# Patient Record
Sex: MALE | Race: BLACK OR AFRICAN AMERICAN | NOT HISPANIC OR LATINO | URBAN - METROPOLITAN AREA
[De-identification: names, ages, dates, MRNs, and addresses within clinical notes are randomized per-mention and may not be internally consistent; named-entity substitution may affect disease eponyms.]

---

## 2024-07-31 ENCOUNTER — EMERGENCY (EMERGENCY)
Facility: HOSPITAL | Age: 64
LOS: 0 days | Discharge: ANOTHER HOSPITAL | End: 2024-08-01
Attending: STUDENT IN AN ORGANIZED HEALTH CARE EDUCATION/TRAINING PROGRAM
Payer: COMMERCIAL

## 2024-07-31 VITALS
RESPIRATION RATE: 18 BRPM | DIASTOLIC BLOOD PRESSURE: 78 MMHG | SYSTOLIC BLOOD PRESSURE: 122 MMHG | HEIGHT: 64 IN | WEIGHT: 110.01 LBS | TEMPERATURE: 98 F | HEART RATE: 72 BPM | OXYGEN SATURATION: 100 %

## 2024-07-31 DIAGNOSIS — R55 SYNCOPE AND COLLAPSE: ICD-10-CM

## 2024-07-31 DIAGNOSIS — Z88.8 ALLERGY STATUS TO OTHER DRUGS, MEDICAMENTS AND BIOLOGICAL SUBSTANCES: ICD-10-CM

## 2024-07-31 DIAGNOSIS — K56.609 UNSPECIFIED INTESTINAL OBSTRUCTION, UNSPECIFIED AS TO PARTIAL VERSUS COMPLETE OBSTRUCTION: ICD-10-CM

## 2024-07-31 DIAGNOSIS — I95.1 ORTHOSTATIC HYPOTENSION: ICD-10-CM

## 2024-07-31 LAB
BASOPHILS # BLD AUTO: 0.02 K/UL — SIGNIFICANT CHANGE UP (ref 0–0.2)
BASOPHILS NFR BLD AUTO: 0.4 % — SIGNIFICANT CHANGE UP (ref 0–2)
D DIMER BLD IA.RAPID-MCNC: 306 NG/ML DDU — HIGH
EOSINOPHIL # BLD AUTO: 0 K/UL — SIGNIFICANT CHANGE UP (ref 0–0.5)
EOSINOPHIL NFR BLD AUTO: 0 % — SIGNIFICANT CHANGE UP (ref 0–6)
HCT VFR BLD CALC: 32.8 % — LOW (ref 39–50)
HGB BLD-MCNC: 11 G/DL — LOW (ref 13–17)
IMM GRANULOCYTES NFR BLD AUTO: 0.4 % — SIGNIFICANT CHANGE UP (ref 0–0.9)
LYMPHOCYTES # BLD AUTO: 0.65 K/UL — LOW (ref 1–3.3)
LYMPHOCYTES # BLD AUTO: 12.3 % — LOW (ref 13–44)
MCHC RBC-ENTMCNC: 30.6 PG — SIGNIFICANT CHANGE UP (ref 27–34)
MCHC RBC-ENTMCNC: 33.5 G/DL — SIGNIFICANT CHANGE UP (ref 32–36)
MCV RBC AUTO: 91.4 FL — SIGNIFICANT CHANGE UP (ref 80–100)
MONOCYTES # BLD AUTO: 0.61 K/UL — SIGNIFICANT CHANGE UP (ref 0–0.9)
MONOCYTES NFR BLD AUTO: 11.5 % — SIGNIFICANT CHANGE UP (ref 2–14)
NEUTROPHILS # BLD AUTO: 4 K/UL — SIGNIFICANT CHANGE UP (ref 1.8–7.4)
NEUTROPHILS NFR BLD AUTO: 75.4 % — SIGNIFICANT CHANGE UP (ref 43–77)
NRBC # BLD: 0 /100 WBCS — SIGNIFICANT CHANGE UP (ref 0–0)
NT-PROBNP SERPL-SCNC: 229 PG/ML — HIGH (ref 0–125)
PLATELET # BLD AUTO: 295 K/UL — SIGNIFICANT CHANGE UP (ref 150–400)
RBC # BLD: 3.59 M/UL — LOW (ref 4.2–5.8)
RBC # FLD: 13.2 % — SIGNIFICANT CHANGE UP (ref 10.3–14.5)
TROPONIN I, HIGH SENSITIVITY RESULT: 22.2 NG/L — SIGNIFICANT CHANGE UP
WBC # BLD: 5.3 K/UL — SIGNIFICANT CHANGE UP (ref 3.8–10.5)
WBC # FLD AUTO: 5.3 K/UL — SIGNIFICANT CHANGE UP (ref 3.8–10.5)

## 2024-07-31 RX ORDER — SODIUM CHLORIDE 0.9 % (FLUSH) 0.9 %
1000 SYRINGE (ML) INJECTION ONCE
Refills: 0 | Status: COMPLETED | OUTPATIENT
Start: 2024-07-31 | End: 2024-07-31

## 2024-07-31 RX ADMIN — Medication 1000 MILLILITER(S): at 23:47

## 2024-07-31 NOTE — ED ADULT TRIAGE NOTE - STATUS:
Patient called via triage with c/o nausea, retching but no vomiting, chills but afebrile, and just reports feeling like she may be coming down with something the past couple days.  Now with back pain today - unsure if it is related to retching a lot yesterday.    Wondering if she needs to go to ED or if she can stay home & monitor symptoms.  Instructed to go to ED if develops fever, abdominal pain, vomiting & unable to keep liquids/meds down.  Will have coordinator f/u tomorrow.   [employer]

## 2024-07-31 NOTE — ED ADULT NURSE NOTE - OBJECTIVE STATEMENT
Pt presents to ED c/o near syncope while on plane from [place]. She admits that "I didn't really eat much all day". At this time, denies headache, dezziness, chest pain, nausea. Pt presents to ED a&ox3 c/o dizziness, vomiting, and near syncope while on plane from [place]. She admits that "I didn't really eat much all day". At this time, denies hematemesis, headache, dizziness, chest pain, nausea, body aches, chills, chest pain, urinary symptom. PT placed on cardiac monitor. Respirations are even and unlabored on room air. The pt adds that she has had history of SBO before, and has had nasogastric tubes placed before. Her last bowel movement was on the plane. She adds that she has been passing gas tonight and this morning.

## 2024-07-31 NOTE — ED ADULT NURSE NOTE - NSFALLRISKINTERV_ED_ALL_ED
Assistance OOB with selected safe patient handling equipment if applicable/Communicate fall risk and risk factors to all staff, patient, and family/Orthostatic vital signs/Provide visual cue: yellow wristband, yellow gown, etc/Reinforce activity limits and safety measures with patient and family/Call bell, personal items and telephone in reach/Instruct patient to call for assistance before getting out of bed/chair/stretcher/Non-slip footwear applied when patient is off stretcher/[place] to call system/Physically safe environment - no spills, clutter or unnecessary equipment/Purposeful Proactive Rounding/Room/bathroom lighting operational, light cord in reach

## 2024-07-31 NOTE — ED ADULT NURSE NOTE - CHIEF COMPLAINT QUOTE
biba from [facility] s/p near syncope while on flight from [facility] noted with orthostatic hypotension per paramedics pt states didn't eat well today, denies dizziness at present, c/o abdominal bloating denies pain at present hx htn and pseudo bowel obstruction with intestinal surgery x 2

## 2024-07-31 NOTE — ED ADULT NURSE NOTE - NS ED NURSE DISCH DISPOSITION
TIA/ISCHEMIC/HEMORRHAGIC STROKE  Ischemic Stroke-LEFT FRONTAL    YOUR STROKE RISK FACTORS INCLUDE:   It is important that you know your risk factors and that you work with your doctor on treatment and lifestyle changes to lower your risk of having a future stroke.     Personal Stroke Risks: Non-Modifiable: Age over 55 years, Male  Personal Stroke Risks: Modifiable: High blood pressure, High cholesterol, Irregular heart beat (Atrial Fib)    MEDICATIONS:  See Discharge Medication List  Take medications as they are scheduled, and talk with your physician if there are problems taking medications.  Keep a list of medications up to date and carry with you at all times.      VACCINES:  Most Recent Immunizations   Administered Date(s) Administered    Influenza, High Dose quadrivalent, preserve-free 09/28/2022    TD Adult, Adsorbed 02/02/2002    Tdap 04/15/2023     Follow up with your doctor regarding influenza and/or pneumonia vaccine(s).    ACTIVITY:  Balance activity with rest    SMOKING:  Avoid all tobacco products and second hand smoke.  Smoking Cessation Counseling offered:  [place] Toll Free Quit Line: [phone]  [place] Tobacco Quit Line: 1-531-QUIT-YES ([phone])      DIET:  Low fat/ low cholesterol    EDUCATION MATERIALS:  Stroke Folder    B. E.  F.A.S.T. is an easy way to remember the signs of stroke.    Call 9-1-1 if:    BALANCE-Sudden loss of coordination or balance    EYES-Sudden change in vision    FACE-Sudden weakness on one side of the face or facial droop    ARM-Sudden arm or leg weakness or numbness    SPEECH-Sudden slurred speech, trouble speaking, trouble understanding speech    TERRIBLE HEADACHE-Sudden onset of a terrible headache    If someone has any of these symptoms, even if they go away, it is important to call 911 immediately. It is important to make note of the time the symptoms first appeared to receive the best treatment.     Transferred

## 2024-08-01 VITALS
TEMPERATURE: 98 F | SYSTOLIC BLOOD PRESSURE: 151 MMHG | HEART RATE: 63 BPM | OXYGEN SATURATION: 100 % | RESPIRATION RATE: 15 BRPM | DIASTOLIC BLOOD PRESSURE: 91 MMHG

## 2024-08-01 LAB
ALBUMIN SERPL ELPH-MCNC: 4.3 G/DL — SIGNIFICANT CHANGE UP (ref 3.3–5)
ALP SERPL-CCNC: 95 U/L — SIGNIFICANT CHANGE UP (ref 40–120)
ALT FLD-CCNC: 24 U/L — SIGNIFICANT CHANGE UP (ref 12–78)
ANION GAP SERPL CALC-SCNC: 12 MMOL/L — SIGNIFICANT CHANGE UP (ref 5–17)
ANION GAP SERPL CALC-SCNC: 13 MMOL/L — SIGNIFICANT CHANGE UP (ref 5–17)
APTT BLD: 24.5 SEC — SIGNIFICANT CHANGE UP (ref 24.5–35.6)
AST SERPL-CCNC: 10 U/L — LOW (ref 15–37)
BILIRUB SERPL-MCNC: 0.3 MG/DL — SIGNIFICANT CHANGE UP (ref 0.2–1.2)
BLD GP AB SCN SERPL QL: SIGNIFICANT CHANGE UP
BUN SERPL-MCNC: 65 MG/DL — HIGH (ref 7–23)
BUN SERPL-MCNC: 68 MG/DL — HIGH (ref 7–23)
CALCIUM SERPL-MCNC: 10.7 MG/DL — HIGH (ref 8.5–10.1)
CALCIUM SERPL-MCNC: 11 MG/DL — HIGH (ref 8.5–10.1)
CHLORIDE SERPL-SCNC: 92 MMOL/L — LOW (ref 96–108)
CHLORIDE SERPL-SCNC: 94 MMOL/L — LOW (ref 96–108)
CO2 SERPL-SCNC: 32 MMOL/L — HIGH (ref 22–31)
CO2 SERPL-SCNC: 33 MMOL/L — HIGH (ref 22–31)
CREAT SERPL-MCNC: 5.06 MG/DL — HIGH (ref 0.5–1.3)
CREAT SERPL-MCNC: 5.09 MG/DL — HIGH (ref 0.5–1.3)
EGFR: 12 ML/MIN/1.73M2 — LOW
EGFR: 12 ML/MIN/1.73M2 — LOW
GLUCOSE SERPL-MCNC: 100 MG/DL — HIGH (ref 70–99)
GLUCOSE SERPL-MCNC: 108 MG/DL — HIGH (ref 70–99)
INR BLD: 0.94 RATIO — SIGNIFICANT CHANGE UP (ref 0.85–1.18)
LACTATE SERPL-SCNC: 1.2 MMOL/L — SIGNIFICANT CHANGE UP (ref 0.7–2)
MAGNESIUM SERPL-MCNC: 2.7 MG/DL — HIGH (ref 1.6–2.6)
MAGNESIUM SERPL-MCNC: 2.7 MG/DL — HIGH (ref 1.6–2.6)
PHOSPHATE SERPL-MCNC: 4.4 MG/DL — SIGNIFICANT CHANGE UP (ref 2.5–4.5)
POTASSIUM SERPL-MCNC: 2.8 MMOL/L — CRITICAL LOW (ref 3.5–5.3)
POTASSIUM SERPL-MCNC: 3.2 MMOL/L — LOW (ref 3.5–5.3)
POTASSIUM SERPL-SCNC: 2.8 MMOL/L — CRITICAL LOW (ref 3.5–5.3)
POTASSIUM SERPL-SCNC: 3.2 MMOL/L — LOW (ref 3.5–5.3)
PROT SERPL-MCNC: 9.2 GM/DL — HIGH (ref 6–8.3)
PROTHROM AB SERPL-ACNC: 11.3 SEC — SIGNIFICANT CHANGE UP (ref 9.5–13)
SODIUM SERPL-SCNC: 138 MMOL/L — SIGNIFICANT CHANGE UP (ref 135–145)
SODIUM SERPL-SCNC: 138 MMOL/L — SIGNIFICANT CHANGE UP (ref 135–145)

## 2024-08-01 RX ORDER — DEXTROSE MONOHYDRATE AND SODIUM CHLORIDE 5; .3 G/100ML; G/100ML
1000 INJECTION, SOLUTION INTRAVENOUS ONCE
Refills: 0 | Status: COMPLETED | OUTPATIENT
Start: 2024-08-01 | End: 2024-08-01

## 2024-08-01 RX ORDER — POTASSIUM CHLORIDE 600 MG/1
40 TABLET, FILM COATED, EXTENDED RELEASE ORAL ONCE
Refills: 0 | Status: COMPLETED | OUTPATIENT
Start: 2024-08-01 | End: 2024-08-01

## 2024-08-01 RX ORDER — POTASSIUM CHLORIDE 600 MG/1
10 TABLET, FILM COATED, EXTENDED RELEASE ORAL ONCE
Refills: 0 | Status: COMPLETED | OUTPATIENT
Start: 2024-08-01 | End: 2024-08-01

## 2024-08-01 RX ADMIN — Medication 1000 MILLILITER(S): at 00:50

## 2024-08-01 RX ADMIN — POTASSIUM CHLORIDE 40 MILLIEQUIVALENT(S): 600 TABLET, FILM COATED, EXTENDED RELEASE ORAL at 00:16

## 2024-08-01 RX ADMIN — POTASSIUM CHLORIDE 100 MILLIEQUIVALENT(S): 600 TABLET, FILM COATED, EXTENDED RELEASE ORAL at 00:17

## 2024-08-01 RX ADMIN — DEXTROSE MONOHYDRATE AND SODIUM CHLORIDE 1000 MILLILITER(S): 5; .3 INJECTION, SOLUTION INTRAVENOUS at 02:35

## 2024-08-01 RX ADMIN — DEXTROSE MONOHYDRATE AND SODIUM CHLORIDE 1000 MILLILITER(S): 5; .3 INJECTION, SOLUTION INTRAVENOUS at 03:35

## 2024-08-01 RX ADMIN — DEXTROSE MONOHYDRATE AND SODIUM CHLORIDE 1000 MILLILITER(S): 5; .3 INJECTION, SOLUTION INTRAVENOUS at 03:48

## 2024-08-01 RX ADMIN — POTASSIUM CHLORIDE 10 MILLIEQUIVALENT(S): 600 TABLET, FILM COATED, EXTENDED RELEASE ORAL at 01:20

## 2024-08-01 RX ADMIN — DEXTROSE MONOHYDRATE AND SODIUM CHLORIDE 1000 MILLILITER(S): 5; .3 INJECTION, SOLUTION INTRAVENOUS at 04:50

## 2024-08-01 NOTE — CONSULT NOTE ADULT - SUBJECTIVE AND OBJECTIVE BOX
GENERAL SURGERY CONSULT NOTE    Patient is a 63 year old female who presents with a chief complaint of dizziness and vomiting.     HPI: 62 y/o female PMHx HTN, CKD (baseline 2.5), open myomectomies in 1992 & 2002, multiples SBOs, ex lap in 2016, laparoscopy and FRANK in 2017 & 2021 at [facility] presents with dizziness and vomiting today. Pt was on a 13 hour flight and came straight from the airport. Pt has had a poor appetite. Last passed flatus at the airport. Last BM was on the plane. Patient denies fever, chills, constipation, diarrhea, melena, hematochezia, dysuria, hematuria, chest pain, shortness of breath, dizziness, cough.    REVIEW OF SYSTEMS:  CONSTITUTIONAL: No fever, weight loss, or fatigue  EYES: No eye pain, visual disturbances, discharge  ENMT: No difficulty hearing, tinnitus, vertigo; No sinus or throat pain  NECK: No pain or stiffness  BREASTS: No pain, masses, or nipple discharge  RESPIRATORY: No cough, wheezing, chills or hemoptysis; No shortness of breath  CARDIOVASCULAR: No chest pain, palpitations, dizziness, or leg swelling  GASTROINTESTINAL: +nausea, vomiting. No abdominal pain. No hematemesis; No diarrhea or constipation. No melena or hematochezia.  GENITOURINARY: No dysuria, frequency, hematuria, or incontinence  NEUROLOGICAL: No headaches, memory loss, loss of strength, numbness, or tremors  SKIN: No itching, burning, rashes, or lesions   LYMPH NODES: No enlarged glands  ENDOCRINE: No heat or cold intolerance; No hair loss  MUSCULOSKELETAL: No joint pain or swelling; No muscle, back, or extremity pain  PSYCHIATRIC: No depression, anxiety, mood swings, or difficulty sleeping  HEME/LYMPH: No easy bruising, or bleeding gums  ALLERY AND IMMUNOLOGIC: No hives or eczema     PAST MEDICAL & SURGICAL HISTORY:  HTN, CKD (baseline 2.5), open myomectomies in 1992 & 2002, multiples SBOs, ex lap in 2016, laparoscopy and FRANK in 2017 & 2021 at [facility]     MEDICATIONS:  Amlodipine 5mg daily  Candesartan 32mg daily  ?HCTZ 25mg daily    Allergies  hydrALAZINE (Rash)    SOCIAL HISTORY: Denies tobacco, alcohol, illicit drug use.           FAMILY HISTORY: No known family hx.     Vital Signs Last 24 Hrs  T(C): 36.5 (31 Jul 2024 22:13), Max: 36.5 (31 Jul 2024 22:13)  T(F): 97.7 (31 Jul 2024 22:13), Max: 97.7 (31 Jul 2024 22:13)  HR: 71 (01 Aug 2024 01:23) (71 - 75)  BP: 134/75 (01 Aug 2024 01:23) (122/78 - 134/75)  BP(mean): --  RR: 14 (01 Aug 2024 01:23) (14 - 18)  SpO2: 100% (01 Aug 2024 01:23) (100% - 100%)    Parameters below as of 01 Aug 2024 01:23  Patient On (Oxygen Delivery Method): room air    PHYSICAL EXAM:  CONSTITUTIONAL: NAD, well-developed  HEAD:  Atraumatic, Normocephalic  EYES: Conjunctiva and sclera clear  ENMT: No tonsillar erythema, exudates, or enlargement; Moist mucous membranes, No lesions  NECK: Supple, No JVD, Normal thyroid  NERVOUS SYSTEM:  Alert & Oriented X3  RESPIRATORY: Clear to auscultation bilaterally; No rales, rhonchi, wheezing  CARDIOVASCULAR: Regular rate and rhythm. S1S2  GASTROINTESTINAL: Nondistended, +BS, soft, nontender, no guarding, no rigidity   MUSCULOSKELETAL: 2+ Peripheral Pulses, No clubbing, cyanosis, or edema     LABS:                        11.0   5.30  )-----------( 295      ( 31 Jul 2024 23:10 )             32.8     07-31    138  |  92<L>  |  65<H>  ----------------------------<  100<H>  2.8<LL>   |  33<H>  |  5.06<H>    Ca    11.0<H>      31 Jul 2024 23:10  Mg     2.7     07-31    TPro  9.2<H>  /  Alb  4.3  /  TBili  0.3  /  DBili  x   /  AST  10<L>  /  ALT  24  /  AlkPhos  95  07-31    PT/INR - ( 01 Aug 2024 02:30 )   PT: 11.3 sec;   INR: 0.94 ratio      PTT - ( 01 Aug 2024 02:30 )  PTT:24.5 sec    Urinalysis Basic - ( 31 Jul 2024 23:10 )    Color: x / Appearance: x / SG: x / pH: x  Gluc: 100 mg/dL / Ketone: x  / Bili: x / Urobili: x   Blood: x / Protein: x / Nitrite: x   Leuk Esterase: x / RBC: x / WBC x   Sq Epi: x / Non Sq Epi: x / Bacteria: x    < from: CT Abdomen and Pelvis No Cont (08.01.24 @ 00:46) >  FINDINGS: Absence of intravenous contrast limits evaluation of   vasculature and visceral organs.    LOWER CHEST: Mild bibasilar dependent atelectasis.    LIVER: Within normal limits.  BILE DUCTS: Focally dilated CBD measuring 1 cm. Correlate with clinical   parameters and consider nonemergent MR.  GALLBLADDER: Within normal limits.  SPLEEN: Within normal limits.  PANCREAS: Within normal limits.  ADRENALS: Within normal limits.  KIDNEYS/URETERS: No hydronephrosis. Atrophied.    BLADDER: Within normal limits.  REPRODUCTIVE ORGANS: Globular uterus containing calcified fibroids.    BOWEL: Marked diffusely dilated fluid-filled stomach. Additional marked   diffuse fluid-filled distention of duodenum measuring nearly 11.6 cm.   Associated twisting of the small bowel mesentery in the medial aspect of   the right abdomen (46:4) with irregular clustering of small bowel loops   and suggestion of two transition points (2:44-2:68). Closed loop   obstruction with possible internal hernia considered. Midgut volvulus   remains in the differential. Query pneumatosis involving multiple small   bowel loops. Interposition of small bowel loops identified in the right   upper quadrant. No pneumoperitoneum. Hyperdensity within the stomach,   likely ingested material or contrast. Recommend clinical correlation.  PERITONEUM/RETROPERITONEUM: Within normal limits.  VESSELS: Atherosclerotic changes.  LYMPH NODES: No lymphadenopathy.  ABDOMINAL WALL: Within normal limits.  BONES: Degenerative changes.    IMPRESSION:    Findings compatible with high-grade proximal small bowel obstruction.    Marked diffusely dilated and fluid-filled stomach and duodenum.   Associated twisting of the small bowel mesentery in the medial aspect of   the right abdomen (46:4) with irregular clustering of small bowel loops   and suggestion of two transition points (2:44-2:68). Closed loop   obstruction with possible internal hernia considered. Midgut volvulus   remains in the differential. Query pneumatosis involving multiple small   bowel loops. Correlate with lactic acid.    Patient remains at increased risk for perforation, decompression with   endogastric tube and follow-up study with contrast is suggested.    Additional findings as mentioned above.    < end of copied text >   GENERAL SURGERY CONSULT NOTE    Patient is a 63 year old female who presents with a chief complaint of dizziness and vomiting.     HPI: 64 y/o female PMHx HTN, CKD (baseline 2.5), open myomectomies in 1992 & 2002, multiple SBOs, ex lap in 2016, laparoscopy and FRANK in 2017 & 2021 at [facility] presents with dizziness and vomiting today. Pt was on a 13 hour flight and came straight from the airport. Pt has had a poor appetite. Last passed flatus at the airport. Last BM was on the plane. Patient denies fever, chills, constipation, diarrhea, melena, hematochezia, dysuria, hematuria, chest pain, shortness of breath, dizziness, cough.    REVIEW OF SYSTEMS:  CONSTITUTIONAL: No fever, weight loss, or fatigue  EYES: No eye pain, visual disturbances, discharge  ENMT: No difficulty hearing, tinnitus, vertigo; No sinus or throat pain  NECK: No pain or stiffness  BREASTS: No pain, masses, or nipple discharge  RESPIRATORY: No cough, wheezing, chills or hemoptysis; No shortness of breath  CARDIOVASCULAR: No chest pain, palpitations, dizziness, or leg swelling  GASTROINTESTINAL: +nausea, vomiting. No abdominal pain. No hematemesis; No diarrhea or constipation. No melena or hematochezia.  GENITOURINARY: No dysuria, frequency, hematuria, or incontinence  NEUROLOGICAL: No headaches, memory loss, loss of strength, numbness, or tremors  SKIN: No itching, burning, rashes, or lesions   LYMPH NODES: No enlarged glands  ENDOCRINE: No heat or cold intolerance; No hair loss  MUSCULOSKELETAL: No joint pain or swelling; No muscle, back, or extremity pain  PSYCHIATRIC: No depression, anxiety, mood swings, or difficulty sleeping  HEME/LYMPH: No easy bruising, or bleeding gums  ALLERY AND IMMUNOLOGIC: No hives or eczema     PAST MEDICAL & SURGICAL HISTORY:  HTN, CKD (baseline 2.5), open myomectomies in 1992 & 2002, multiple SBOs, ex lap in 2016, laparoscopy and FRANK in 2017 & 2021 at [facility]     MEDICATIONS:  Amlodipine 5mg daily  Candesartan 32mg daily  ?HCTZ 25mg daily    Allergies  hydrALAZINE (Rash)    SOCIAL HISTORY: Denies tobacco, alcohol, illicit drug use.           FAMILY HISTORY: No known family hx.     Vital Signs Last 24 Hrs  T(C): 36.5 (31 Jul 2024 22:13), Max: 36.5 (31 Jul 2024 22:13)  T(F): 97.7 (31 Jul 2024 22:13), Max: 97.7 (31 Jul 2024 22:13)  HR: 71 (01 Aug 2024 01:23) (71 - 75)  BP: 134/75 (01 Aug 2024 01:23) (122/78 - 134/75)  BP(mean): --  RR: 14 (01 Aug 2024 01:23) (14 - 18)  SpO2: 100% (01 Aug 2024 01:23) (100% - 100%)    Parameters below as of 01 Aug 2024 01:23  Patient On (Oxygen Delivery Method): room air    PHYSICAL EXAM:  CONSTITUTIONAL: NAD, well-developed  HEAD:  Atraumatic, Normocephalic  EYES: Conjunctiva and sclera clear  ENMT: No tonsillar erythema, exudates, or enlargement; Moist mucous membranes, No lesions  NECK: Supple, No JVD, Normal thyroid  NERVOUS SYSTEM:  Alert & Oriented X3  RESPIRATORY: Clear to auscultation bilaterally; No rales, rhonchi, wheezing  CARDIOVASCULAR: Regular rate and rhythm. S1S2  GASTROINTESTINAL: Nondistended, +BS, soft, nontender, no guarding, no rigidity   MUSCULOSKELETAL: 2+ Peripheral Pulses, No clubbing, cyanosis, or edema     LABS:                        11.0   5.30  )-----------( 295      ( 31 Jul 2024 23:10 )             32.8     07-31    138  |  92<L>  |  65<H>  ----------------------------<  100<H>  2.8<LL>   |  33<H>  |  5.06<H>    Ca    11.0<H>      31 Jul 2024 23:10  Mg     2.7     07-31    TPro  9.2<H>  /  Alb  4.3  /  TBili  0.3  /  DBili  x   /  AST  10<L>  /  ALT  24  /  AlkPhos  95  07-31    PT/INR - ( 01 Aug 2024 02:30 )   PT: 11.3 sec;   INR: 0.94 ratio      PTT - ( 01 Aug 2024 02:30 )  PTT:24.5 sec    Urinalysis Basic - ( 31 Jul 2024 23:10 )    Color: x / Appearance: x / SG: x / pH: x  Gluc: 100 mg/dL / Ketone: x  / Bili: x / Urobili: x   Blood: x / Protein: x / Nitrite: x   Leuk Esterase: x / RBC: x / WBC x   Sq Epi: x / Non Sq Epi: x / Bacteria: x    < from: CT Abdomen and Pelvis No Cont (08.01.24 @ 00:46) >  FINDINGS: Absence of intravenous contrast limits evaluation of   vasculature and visceral organs.    LOWER CHEST: Mild bibasilar dependent atelectasis.    LIVER: Within normal limits.  BILE DUCTS: Focally dilated CBD measuring 1 cm. Correlate with clinical   parameters and consider nonemergent MR.  GALLBLADDER: Within normal limits.  SPLEEN: Within normal limits.  PANCREAS: Within normal limits.  ADRENALS: Within normal limits.  KIDNEYS/URETERS: No hydronephrosis. Atrophied.    BLADDER: Within normal limits.  REPRODUCTIVE ORGANS: Globular uterus containing calcified fibroids.    BOWEL: Marked diffusely dilated fluid-filled stomach. Additional marked   diffuse fluid-filled distention of duodenum measuring nearly 11.6 cm.   Associated twisting of the small bowel mesentery in the medial aspect of   the right abdomen (46:4) with irregular clustering of small bowel loops   and suggestion of two transition points (2:44-2:68). Closed loop   obstruction with possible internal hernia considered. Midgut volvulus   remains in the differential. Query pneumatosis involving multiple small   bowel loops. Interposition of small bowel loops identified in the right   upper quadrant. No pneumoperitoneum. Hyperdensity within the stomach,   likely ingested material or contrast. Recommend clinical correlation.  PERITONEUM/RETROPERITONEUM: Within normal limits.  VESSELS: Atherosclerotic changes.  LYMPH NODES: No lymphadenopathy.  ABDOMINAL WALL: Within normal limits.  BONES: Degenerative changes.    IMPRESSION:    Findings compatible with high-grade proximal small bowel obstruction.    Marked diffusely dilated and fluid-filled stomach and duodenum.   Associated twisting of the small bowel mesentery in the medial aspect of   the right abdomen (46:4) with irregular clustering of small bowel loops   and suggestion of two transition points (2:44-2:68). Closed loop   obstruction with possible internal hernia considered. Midgut volvulus   remains in the differential. Query pneumatosis involving multiple small   bowel loops. Correlate with lactic acid.    Patient remains at increased risk for perforation, decompression with   endogastric tube and follow-up study with contrast is suggested.    Additional findings as mentioned above.    < end of copied text >

## 2024-08-01 NOTE — ED PROVIDER NOTE - CLINICAL SUMMARY MEDICAL DECISION MAKING FREE TEXT BOX
femal ew/ hx of myomectomy, bowel obstruction s/p ex-lap & laparoscopy presenting to the ED for weakness, vomiting and presyncop    abdominal distension, no pain  concern for obstruction  CT confirming obstruction  surgery consulted  patient wanting surgery at home hospital ([facility])  will transfer to [place]

## 2024-08-01 NOTE — ED PROVIDER NOTE - CONSIDERATION OF ADMISSION OBSERVATION
Consideration of Admission/Observation offered admission for surgery, patient requesting to get surgery done with her surgeon at [facility]. transfer arranged

## 2024-08-01 NOTE — ED PROVIDER NOTE - NSPREEXISTRELATION_ED_A_ED_FT
Patient is followed with Dr. Kitchen ([facility]) after multiple bowel obstructions. Pt wanting surgery to be performed by surgeon at [place] due to previous records

## 2024-08-01 NOTE — CONSULT NOTE ADULT - ASSESSMENT
64 y/o female PMHx HTN, CKD (baseline 2.5), open myomectomies in 1992 & 2002, multiples SBOs, ex lap in 2016, laparoscopy and FRANK in 2017 & 2021 at [facility] presents with dizziness and vomiting today. CT shows high grade SBO, rule out closed loop obstruction.    Plan:  Recommended emergent surgical intervention to patient and her  which they are adamantly refusing. They strongly wish that the patient only has surgery performed at [facility].  Discussed with the ER to arrange emergent transfer to [place].  NPO, IV hydration  NGT inserted into left nare of patient. Over 4L of bilious output so far. Follow up xray  Discussed with Dr. Rondon 62 y/o female PMHx HTN, CKD (baseline 2.5), open myomectomies in 1992 & 2002, multiple SBOs, ex lap in 2016, laparoscopy and FRANK in 2017 & 2021 at [facility] presents with dizziness and vomiting today. CT shows high grade SBO, rule out closed loop obstruction.    Plan:  Recommended emergent surgical intervention to patient and her  which they are adamantly refusing. They strongly wish that the patient only has surgery performed at [facility].  Discussed with the ER to arrange emergent transfer to [place].  NPO, IV hydration  NGT inserted into left nare of patient. Over 4L of bilious output so far. Follow up xray  Discussed with Dr. Rondon

## 2024-08-01 NOTE — ED ADULT NURSE REASSESSMENT NOTE - NS ED NURSE REASSESS COMMENT FT1
Nasogastric tube placed by HEAVENLY Berry. Placement verified with Xr. Ng tube connected to continuous suction. Intake and output flowsheet updated.

## 2024-12-18 NOTE — ED ADULT TRIAGE NOTE - CHIEF COMPLAINT QUOTE
PCP: Dr. Kam Miller          Onset: 4 months ago   Location / description: Legs give out   Precipitating Factors:   History of Seizure   Pain Scale (1-10), 10 highest: 0/10  Associated Symptoms: Fell on last night ( just while walking on yesterday outside legs just gave out , tingling to feet  Denies head injury , scrap to knees , delusion on yesterday, off/on rectal bleeding , black stool on last month  What improves / worsens symptoms: Has  Past 2 months has fallen 5 times , take at least 15-30 minutes  to stand and walk again after a fall   Symptom specific medications: n/a   Recent visits (last 3-4 weeks) for same reason or recent surgery:  No. No.     PLAN:   See Provider/Go to Urgent Care within next 4 hours/  Patient informed that there maybe some internal bleeding.     Patient/Caller refuses to follow recommendations.    Reason for Disposition  • [1] MODERATE weakness (i.e., interferes with work, school, normal activities) AND [2] cause unknown  (Exceptions: weakness with acute minor illness, or weakness from poor fluid intake)    Protocols used: WEAKNESS (GENERALIZED) AND FATIGUE-A-AH       biba from [facility] s/p near syncope while on flight from [facility] noted with orthostatic hypotension per paramedics pt states didn't eat well today, denies dizziness at present, c/o abdominal bloating denies pain at present hx htn and pseudo bowel obstruction with intestinal surgery x 2 You can access the FollowMyHealth Patient Portal offered by [facility] by registering at the following website: [email or web address]. By joining [facility]’s FollowMyHealth portal, you will also be able to view your health information using other applications (apps) compatible with our system.

## 2025-01-07 NOTE — ED ADULT NURSE NOTE - NSFALLRISKASMTTYPE_ED_ALL_ED
Low K diet  Encourage hydration   I see he is aldactone and entresto who is ordering thsi because it may need adjustment  In the mean time start lokelma 10 gm daily and repeat K on Friday  Initial (On Arrival)